# Patient Record
Sex: MALE | Race: WHITE | URBAN - METROPOLITAN AREA
[De-identification: names, ages, dates, MRNs, and addresses within clinical notes are randomized per-mention and may not be internally consistent; named-entity substitution may affect disease eponyms.]

---

## 2019-03-25 ENCOUNTER — OFFICE VISIT (OUTPATIENT)
Dept: URBAN - METROPOLITAN AREA CLINIC 58 | Facility: CLINIC | Age: 64
End: 2019-03-25
Payer: MEDICARE

## 2019-03-25 DIAGNOSIS — H26.493 OTHER SECONDARY CATARACT, BILATERAL: Primary | ICD-10-CM

## 2019-03-25 PROCEDURE — 99204 OFFICE O/P NEW MOD 45 MIN: CPT | Performed by: OPHTHALMOLOGY

## 2019-03-25 ASSESSMENT — VISUAL ACUITY
OS: 20/100
OD: 20/20

## 2019-03-25 ASSESSMENT — INTRAOCULAR PRESSURE
OD: 12
OS: 13

## 2019-03-25 ASSESSMENT — KERATOMETRY
OD: 45.50
OS: 45.38

## 2019-07-22 ENCOUNTER — SURGERY (OUTPATIENT)
Dept: URBAN - METROPOLITAN AREA SURGERY 32 | Facility: SURGERY | Age: 64
End: 2019-07-22
Payer: MEDICARE

## 2019-07-22 PROCEDURE — 66821 AFTER CATARACT LASER SURGERY: CPT | Performed by: OPHTHALMOLOGY

## 2019-08-01 ENCOUNTER — POST-OPERATIVE VISIT (OUTPATIENT)
Dept: URBAN - METROPOLITAN AREA CLINIC 58 | Facility: CLINIC | Age: 64
End: 2019-08-01

## 2019-08-01 DIAGNOSIS — Z09 ENCNTR FOR F/U EXAM AFT TRTMT FOR COND OTH THAN MALIG NEOPLM: Primary | ICD-10-CM

## 2019-08-01 PROCEDURE — 99024 POSTOP FOLLOW-UP VISIT: CPT | Performed by: OPTOMETRIST

## 2019-08-01 ASSESSMENT — VISUAL ACUITY: OS: 20/20

## 2020-02-06 ENCOUNTER — OFFICE VISIT (OUTPATIENT)
Dept: URBAN - METROPOLITAN AREA CLINIC 58 | Facility: CLINIC | Age: 65
End: 2020-02-06
Payer: MEDICARE

## 2020-02-06 DIAGNOSIS — H40.013 OPEN ANGLE WITH BORDERLINE FINDINGS, LOW RISK, BILATERAL: ICD-10-CM

## 2020-02-06 PROCEDURE — 92133 CPTRZD OPH DX IMG PST SGM ON: CPT | Performed by: OPTOMETRIST

## 2020-02-06 PROCEDURE — 92014 COMPRE OPH EXAM EST PT 1/>: CPT | Performed by: OPTOMETRIST

## 2020-02-06 ASSESSMENT — KERATOMETRY
OS: 44.75
OD: 45.38

## 2020-02-06 ASSESSMENT — VISUAL ACUITY
OD: 20/20
OS: 20/20

## 2020-02-06 ASSESSMENT — INTRAOCULAR PRESSURE
OS: 20
OD: 23

## 2020-02-06 NOTE — IMPRESSION/PLAN
Impression: Open angle with borderline findings, low risk, bilateral: H40.013. Plan: K-Spindle. Diagnosis discussed in detail. No treatment required at this time. Continue to observe pt's optic nerve and IOP. OCT done today WNL.

## 2020-02-06 NOTE — IMPRESSION/PLAN
Impression: Other secondary cataract, right eye: H26.491. Plan: Discussed diagnosis with PT.   Recommend YAG PC eval. with cat surgeon

## 2020-02-11 ENCOUNTER — OFFICE VISIT (OUTPATIENT)
Dept: URBAN - METROPOLITAN AREA CLINIC 58 | Facility: CLINIC | Age: 65
End: 2020-02-11
Payer: COMMERCIAL

## 2020-02-11 DIAGNOSIS — H26.491 OTHER SECONDARY CATARACT, RIGHT EYE: ICD-10-CM

## 2020-02-11 DIAGNOSIS — H52.4 PRESBYOPIA: Primary | ICD-10-CM

## 2020-02-11 PROCEDURE — 92012 INTRM OPH EXAM EST PATIENT: CPT | Performed by: OPTOMETRIST

## 2020-02-11 ASSESSMENT — VISUAL ACUITY
OD: 20/20
OS: 20/20

## 2020-02-11 ASSESSMENT — INTRAOCULAR PRESSURE
OD: 20
OS: 20

## 2020-02-11 NOTE — IMPRESSION/PLAN
Impression: Open angle with borderline findings, low risk, bilateral: H40.013. Plan: K-Spindle. Diagnosis discussed in detail. No treatment required at this time. Continue to observe pt's optic nerve and IOP. IOP a little better today.